# Patient Record
Sex: MALE | Race: WHITE | Employment: OTHER | URBAN - METROPOLITAN AREA
[De-identification: names, ages, dates, MRNs, and addresses within clinical notes are randomized per-mention and may not be internally consistent; named-entity substitution may affect disease eponyms.]

---

## 2017-01-01 ENCOUNTER — OFFICE VISIT (OUTPATIENT)
Dept: FAMILY MEDICINE | Facility: CLINIC | Age: 77
End: 2017-01-01
Payer: COMMERCIAL

## 2017-01-01 ENCOUNTER — MEDICAL CORRESPONDENCE (OUTPATIENT)
Dept: HEALTH INFORMATION MANAGEMENT | Facility: CLINIC | Age: 77
End: 2017-01-01

## 2017-01-01 ENCOUNTER — TELEPHONE (OUTPATIENT)
Dept: FAMILY MEDICINE | Facility: CLINIC | Age: 77
End: 2017-01-01

## 2017-01-01 ENCOUNTER — HOSPITAL ENCOUNTER (EMERGENCY)
Facility: CLINIC | Age: 77
Discharge: HOME | End: 2017-04-24
Attending: FAMILY MEDICINE | Admitting: FAMILY MEDICINE
Payer: COMMERCIAL

## 2017-01-01 ENCOUNTER — ALLIED HEALTH/NURSE VISIT (OUTPATIENT)
Dept: FAMILY MEDICINE | Facility: CLINIC | Age: 77
End: 2017-01-01
Payer: COMMERCIAL

## 2017-01-01 VITALS
SYSTOLIC BLOOD PRESSURE: 131 MMHG | OXYGEN SATURATION: 96 % | DIASTOLIC BLOOD PRESSURE: 75 MMHG | RESPIRATION RATE: 16 BRPM | WEIGHT: 217 LBS | BODY MASS INDEX: 31.59 KG/M2 | TEMPERATURE: 98 F

## 2017-01-01 VITALS
WEIGHT: 217.8 LBS | DIASTOLIC BLOOD PRESSURE: 56 MMHG | SYSTOLIC BLOOD PRESSURE: 131 MMHG | TEMPERATURE: 97.8 F | BODY MASS INDEX: 31.7 KG/M2 | HEART RATE: 58 BPM

## 2017-01-01 VITALS
WEIGHT: 220 LBS | DIASTOLIC BLOOD PRESSURE: 75 MMHG | HEART RATE: 59 BPM | SYSTOLIC BLOOD PRESSURE: 126 MMHG | BODY MASS INDEX: 32.02 KG/M2 | TEMPERATURE: 97.5 F

## 2017-01-01 DIAGNOSIS — E11.9 DIABETES MELLITUS TYPE 2, NONINSULIN DEPENDENT (H): Primary | ICD-10-CM

## 2017-01-01 DIAGNOSIS — R55 FAINTING SPELL: ICD-10-CM

## 2017-01-01 DIAGNOSIS — I10 ESSENTIAL HYPERTENSION, BENIGN: ICD-10-CM

## 2017-01-01 DIAGNOSIS — E78.5 HYPERLIPIDEMIA LDL GOAL <100: ICD-10-CM

## 2017-01-01 DIAGNOSIS — Z23 NEED FOR PROPHYLACTIC VACCINATION AND INOCULATION AGAINST INFLUENZA: Primary | ICD-10-CM

## 2017-01-01 DIAGNOSIS — E11.9 TYPE 2 DIABETES MELLITUS WITHOUT COMPLICATION (H): ICD-10-CM

## 2017-01-01 LAB
ALBUMIN SERPL-MCNC: 3.4 G/DL (ref 3.4–5)
ALP SERPL-CCNC: 66 U/L (ref 40–150)
ALT SERPL W P-5'-P-CCNC: 21 U/L (ref 0–70)
ANION GAP SERPL CALCULATED.3IONS-SCNC: 10 MMOL/L (ref 3–14)
ANION GAP SERPL CALCULATED.3IONS-SCNC: 7 MMOL/L (ref 3–14)
AST SERPL W P-5'-P-CCNC: 18 U/L (ref 0–45)
BASOPHILS # BLD AUTO: 0 10E9/L (ref 0–0.2)
BASOPHILS NFR BLD AUTO: 0.4 %
BILIRUB SERPL-MCNC: 0.5 MG/DL (ref 0.2–1.3)
BUN SERPL-MCNC: 18 MG/DL (ref 7–30)
BUN SERPL-MCNC: 19 MG/DL (ref 7–30)
CALCIUM SERPL-MCNC: 8.8 MG/DL (ref 8.5–10.1)
CALCIUM SERPL-MCNC: 9.2 MG/DL (ref 8.5–10.1)
CHLORIDE SERPL-SCNC: 101 MMOL/L (ref 94–109)
CHLORIDE SERPL-SCNC: 105 MMOL/L (ref 94–109)
CHOLEST SERPL-MCNC: 186 MG/DL
CO2 SERPL-SCNC: 25 MMOL/L (ref 20–32)
CO2 SERPL-SCNC: 27 MMOL/L (ref 20–32)
CREAT SERPL-MCNC: 0.96 MG/DL (ref 0.66–1.25)
CREAT SERPL-MCNC: 1.05 MG/DL (ref 0.66–1.25)
CREAT UR-MCNC: 91 MG/DL
DIFFERENTIAL METHOD BLD: NORMAL
EOSINOPHIL # BLD AUTO: 0.2 10E9/L (ref 0–0.7)
EOSINOPHIL NFR BLD AUTO: 1.6 %
ERYTHROCYTE [DISTWIDTH] IN BLOOD BY AUTOMATED COUNT: 12.7 % (ref 10–15)
GFR SERPL CREATININE-BSD FRML MDRD: 68 ML/MIN/1.7M2
GFR SERPL CREATININE-BSD FRML MDRD: 75 ML/MIN/1.7M2
GLUCOSE BLDC GLUCOMTR-MCNC: 242 MG/DL (ref 70–99)
GLUCOSE SERPL-MCNC: 179 MG/DL (ref 70–99)
GLUCOSE SERPL-MCNC: 204 MG/DL (ref 70–99)
HBA1C MFR BLD: 7.1 % (ref 4.3–6)
HCT VFR BLD AUTO: 45.6 % (ref 40–53)
HDLC SERPL-MCNC: 43 MG/DL
HGB BLD-MCNC: 15.6 G/DL (ref 13.3–17.7)
IMM GRANULOCYTES # BLD: 0 10E9/L (ref 0–0.4)
IMM GRANULOCYTES NFR BLD: 0.3 %
LDLC SERPL CALC-MCNC: 113 MG/DL
LYMPHOCYTES # BLD AUTO: 1.4 10E9/L (ref 0.8–5.3)
LYMPHOCYTES NFR BLD AUTO: 13.8 %
MCH RBC QN AUTO: 30.6 PG (ref 26.5–33)
MCHC RBC AUTO-ENTMCNC: 34.2 G/DL (ref 31.5–36.5)
MCV RBC AUTO: 90 FL (ref 78–100)
MICROALBUMIN UR-MCNC: 18 MG/L
MICROALBUMIN/CREAT UR: 19.54 MG/G CR (ref 0–17)
MONOCYTES # BLD AUTO: 1 10E9/L (ref 0–1.3)
MONOCYTES NFR BLD AUTO: 10 %
NEUTROPHILS # BLD AUTO: 7.3 10E9/L (ref 1.6–8.3)
NEUTROPHILS NFR BLD AUTO: 73.9 %
NONHDLC SERPL-MCNC: 143 MG/DL
PLATELET # BLD AUTO: 220 10E9/L (ref 150–450)
POTASSIUM SERPL-SCNC: 3.9 MMOL/L (ref 3.4–5.3)
POTASSIUM SERPL-SCNC: 4.6 MMOL/L (ref 3.4–5.3)
PROT SERPL-MCNC: 7.2 G/DL (ref 6.8–8.8)
RBC # BLD AUTO: 5.09 10E12/L (ref 4.4–5.9)
SODIUM SERPL-SCNC: 136 MMOL/L (ref 133–144)
SODIUM SERPL-SCNC: 139 MMOL/L (ref 133–144)
T4 FREE SERPL-MCNC: 1.07 NG/DL (ref 0.76–1.46)
TRIGL SERPL-MCNC: 148 MG/DL
TROPONIN I SERPL-MCNC: 0.01 UG/L (ref 0–0.04)
TSH SERPL DL<=0.05 MIU/L-ACNC: 2.53 MU/L (ref 0.4–4)
WBC # BLD AUTO: 9.9 10E9/L (ref 4–11)

## 2017-01-01 PROCEDURE — 85025 COMPLETE CBC W/AUTO DIFF WBC: CPT | Performed by: FAMILY MEDICINE

## 2017-01-01 PROCEDURE — G0008 ADMIN INFLUENZA VIRUS VAC: HCPCS

## 2017-01-01 PROCEDURE — 93005 ELECTROCARDIOGRAM TRACING: CPT

## 2017-01-01 PROCEDURE — 84484 ASSAY OF TROPONIN QUANT: CPT | Performed by: FAMILY MEDICINE

## 2017-01-01 PROCEDURE — 84443 ASSAY THYROID STIM HORMONE: CPT | Performed by: FAMILY MEDICINE

## 2017-01-01 PROCEDURE — 25000128 H RX IP 250 OP 636: Performed by: FAMILY MEDICINE

## 2017-01-01 PROCEDURE — 99207 ZZC NO CHARGE NURSE ONLY: CPT

## 2017-01-01 PROCEDURE — 93308 TTE F-UP OR LMTD: CPT

## 2017-01-01 PROCEDURE — 36415 COLL VENOUS BLD VENIPUNCTURE: CPT | Performed by: FAMILY MEDICINE

## 2017-01-01 PROCEDURE — 00000146 ZZHCL STATISTIC GLUCOSE BY METER IP

## 2017-01-01 PROCEDURE — 99214 OFFICE O/P EST MOD 30 MIN: CPT | Performed by: FAMILY MEDICINE

## 2017-01-01 PROCEDURE — 84439 ASSAY OF FREE THYROXINE: CPT | Performed by: FAMILY MEDICINE

## 2017-01-01 PROCEDURE — 80061 LIPID PANEL: CPT | Performed by: FAMILY MEDICINE

## 2017-01-01 PROCEDURE — 80048 BASIC METABOLIC PNL TOTAL CA: CPT | Performed by: FAMILY MEDICINE

## 2017-01-01 PROCEDURE — 82043 UR ALBUMIN QUANTITATIVE: CPT | Performed by: FAMILY MEDICINE

## 2017-01-01 PROCEDURE — 83036 HEMOGLOBIN GLYCOSYLATED A1C: CPT | Performed by: FAMILY MEDICINE

## 2017-01-01 PROCEDURE — 99284 EMERGENCY DEPT VISIT MOD MDM: CPT | Mod: 25

## 2017-01-01 PROCEDURE — 90662 IIV NO PRSV INCREASED AG IM: CPT

## 2017-01-01 PROCEDURE — 80053 COMPREHEN METABOLIC PANEL: CPT | Performed by: FAMILY MEDICINE

## 2017-01-01 PROCEDURE — 99284 EMERGENCY DEPT VISIT MOD MDM: CPT | Mod: 25 | Performed by: FAMILY MEDICINE

## 2017-01-01 PROCEDURE — 93308 TTE F-UP OR LMTD: CPT | Mod: 26 | Performed by: FAMILY MEDICINE

## 2017-01-01 RX ORDER — SIMVASTATIN 10 MG
10 TABLET ORAL AT BEDTIME
Qty: 90 TABLET | Refills: 3 | Status: CANCELLED | OUTPATIENT
Start: 2017-01-01

## 2017-01-01 RX ORDER — LISINOPRIL AND HYDROCHLOROTHIAZIDE 20; 25 MG/1; MG/1
1 TABLET ORAL EVERY MORNING
Qty: 90 TABLET | Refills: 0 | Status: SHIPPED | OUTPATIENT
Start: 2017-01-01 | End: 2017-01-01

## 2017-01-01 RX ORDER — LISINOPRIL AND HYDROCHLOROTHIAZIDE 20; 25 MG/1; MG/1
1 TABLET ORAL EVERY MORNING
Qty: 90 TABLET | Refills: 0 | Status: SHIPPED | OUTPATIENT
Start: 2017-01-01

## 2017-01-01 RX ORDER — GLIPIZIDE 2.5 MG/1
5 TABLET, EXTENDED RELEASE ORAL
Qty: 180 TABLET | Refills: 0 | Status: SHIPPED | OUTPATIENT
Start: 2017-01-01 | End: 2017-01-01

## 2017-01-01 RX ORDER — PIOGLITAZONEHYDROCHLORIDE 15 MG/1
15 TABLET ORAL DAILY
Qty: 90 TABLET | Refills: 0 | Status: SHIPPED | OUTPATIENT
Start: 2017-01-01 | End: 2017-01-01 | Stop reason: SINTOL

## 2017-01-01 RX ORDER — METOPROLOL SUCCINATE 25 MG/1
12.5 TABLET, EXTENDED RELEASE ORAL DAILY
Qty: 45 TABLET | Refills: 3 | Status: CANCELLED | OUTPATIENT
Start: 2017-01-01

## 2017-01-01 RX ORDER — METOPROLOL SUCCINATE 25 MG/1
12.5 TABLET, EXTENDED RELEASE ORAL DAILY
Qty: 45 TABLET | Refills: 0 | Status: SHIPPED | OUTPATIENT
Start: 2017-01-01 | End: 2017-01-01

## 2017-01-01 RX ORDER — GLIPIZIDE 2.5 MG/1
5 TABLET, EXTENDED RELEASE ORAL
Qty: 180 TABLET | Refills: 2 | Status: SHIPPED | OUTPATIENT
Start: 2017-01-01

## 2017-01-01 RX ADMIN — SODIUM CHLORIDE 1000 ML: 9 INJECTION, SOLUTION INTRAVENOUS at 15:11

## 2017-03-27 NOTE — TELEPHONE ENCOUNTER
Lisinopril/HCTZ     Last Written Prescription Date: 12/07/16  Last Fill Quantity: 90, # refills: 0  Last Office Visit with [facility] or [facility] prescribing provider: 10/14/16       Potassium   Date Value Ref Range Status   05/31/2016 4.0 3.4 - 5.3 mmol/L Final     Creatinine   Date Value Ref Range Status   05/31/2016 1.63 (H) 0.66 - 1.25 mg/dL Final     BP Readings from Last 3 Encounters:   10/14/16 117/70   05/24/16 130/60   08/20/15 128/66     Pioglitazone         Last Written Prescription Date: 12/07/16  Last Fill Quantity: 90, # refills: 0  Last Office Visit with [facility] or [facility] prescribing provider:  10/14/16        BP Readings from Last 3 Encounters:   10/14/16 117/70   05/24/16 130/60   08/20/15 128/66     Lab Results   Component Value Date    MICROL 14 05/24/2016     No results found for: MICROALBUMIN  Creatinine   Date Value Ref Range Status   05/31/2016 1.63 (H) 0.66 - 1.25 mg/dL Final   ]  GFR Estimate   Date Value Ref Range Status   05/31/2016 41 (L) >60 mL/min/1.7m2 Final     Comment:     Non  GFR Calc   04/21/2015 76 >60 mL/min/1.7m2 Final     Comment:     Non  GFR Calc   06/10/2014 77 >60 mL/min/1.7m2 Final     GFR Estimate If Black   Date Value Ref Range Status   05/31/2016 50 (L) >60 mL/min/1.7m2 Final     Comment:      GFR Calc   04/21/2015 >90   GFR Calc   >60 mL/min/1.7m2 Final   06/10/2014 >90 >60 mL/min/1.7m2 Final     Lab Results   Component Value Date    CHOL 140 05/31/2016     Lab Results   Component Value Date    HDL 40 05/31/2016     Lab Results   Component Value Date    LDL 68 05/31/2016     Lab Results   Component Value Date    TRIG 161 05/31/2016     Lab Results   Component Value Date    CHOLHDLRATIO 3.4 04/21/2015     Lab Results   Component Value Date    AST 25 10/29/2012     Lab Results   Component Value Date    ALT 20 08/17/2015     Lab Results   Component Value Date    A1C 7.2 05/31/2016    A1C 6.5 08/17/2015     A1C 7.3 04/21/2015    A1C 7.2 03/05/2015    A1C 8.0 06/10/2014     Potassium   Date Value Ref Range Status   05/31/2016 4.0 3.4 - 5.3 mmol/L Final

## 2017-03-29 NOTE — TELEPHONE ENCOUNTER
Routing refill request to provider for review/approval because:  Labs out of range:  See below    Ksenia Rico RN

## 2017-04-24 NOTE — ED AVS SNAPSHOT
[facility] Emergency Department    [address] [address]    Phone:  [phone]    Fax:  [phone]                                       Richard Sosa   MRN: [id number]    Department:  [facility] Emergency Department   Date of Visit:  4/24/2017           Patient Information     Date Of Birth          1940        Your diagnoses for this visit were:     Fainting spell        You were seen by Blake Durán MD.        Discharge Instructions       Return to the Emergency Room if the following occurs:     Recurrent fainting, new chest pain or trouble breathing, or for any concern at anytime.      Or, follow-up with the following provider as we discussed:     Return to your primary doctor as needed.    Medications discussed:    None new.  No changes.    If you received pain-relieving or sedating medication during your time in the ER, avoid alcohol, driving automobiles, or working with machinery.  Also, a responsible adult must stay with you.      If you had X-rays or labs done we will attempt to contact you if there is a change needed in your care.      Call the Nurse Advice Line at [phone] or [phone] for any concern at anytime.      24 Hour Appointment Hotline       To make an appointment at any [facility] clinic, call [phone] ([phone]). If you don't have a family doctor or clinic, we will help you find one. [facility] clinics are conveniently located to serve the needs of you and your family.             Review of your medicines      Our records show that you are taking the medicines listed below. If these are incorrect, please call your family doctor or clinic.        Dose / Directions Last dose taken    aspirin 81 MG EC tablet   Dose:  81 mg        Take 81 mg by mouth daily   Refills:  0        glipiZIDE 2.5 MG 24 hr tablet   Commonly known as:  GLUCOTROL XL   Dose:  5 mg   Quantity:  180 tablet        Take 2 tablets (5 mg) by mouth daily (with  breakfast)   Refills:  3        IBUPROFEN PO   Dose:  200 mg        Take 200 mg by mouth 2 times daily   Refills:  0        lisinopril-hydrochlorothiazide 20-25 MG per tablet   Commonly known as:  PRINZIDE/ZESTORETIC   Dose:  1 tablet   Quantity:  90 tablet        Take 1 tablet by mouth every morning   Refills:  0        metFORMIN 500 MG tablet   Commonly known as:  GLUCOPHAGE   Dose:  500 mg   Quantity:  180 tablet        Take 1 tablet (500 mg) by mouth 2 times daily (with meals)   Refills:  3        metoprolol 25 MG 24 hr tablet   Commonly known as:  TOPROL-XL   Dose:  12.5 mg   Quantity:  45 tablet        Take 0.5 tablets (12.5 mg) by mouth daily   Refills:  3        simvastatin 10 MG tablet   Commonly known as:  ZOCOR   Dose:  10 mg   Quantity:  90 tablet        Take 1 tablet (10 mg) by mouth At Bedtime (Needs follow-up appointment for this medication)   Refills:  3                Procedures and tests performed during your visit     CBC with platelets differential    Comprehensive metabolic panel    EKG 12 lead    Glucose Monitoring Nursing    Glucose by meter    POC US ECHO LIMITED    Troponin I      Orders Needing Specimen Collection     None      Pending Results     No orders found from 4/22/2017 to 4/25/2017.            Pending Culture Results     No orders found from 4/22/2017 to 4/25/2017.            Test Results From Your Hospital Stay        4/24/2017  2:40 PM      Component Results     Component Value Ref Range & Units Status    Glucose 242 (H) 70 - 99 mg/dL Final         4/24/2017  3:07 PM      Component Results     Component Value Ref Range & Units Status    WBC 9.9 4.0 - 11.0 10e9/L Final    RBC Count 5.09 4.4 - 5.9 10e12/L Final    Hemoglobin 15.6 13.3 - 17.7 g/dL Final    Hematocrit 45.6 40.0 - 53.0 % Final    MCV 90 78 - 100 fl Final    MCH 30.6 26.5 - 33.0 pg Final    MCHC 34.2 31.5 - 36.5 g/dL Final    RDW 12.7 10.0 - 15.0 % Final    Platelet Count 220 150 - 450 10e9/L Final    Diff Method  Automated Method  Final    % Neutrophils 73.9 % Final    % Lymphocytes 13.8 % Final    % Monocytes 10.0 % Final    % Eosinophils 1.6 % Final    % Basophils 0.4 % Final    % Immature Granulocytes 0.3 % Final    Absolute Neutrophil 7.3 1.6 - 8.3 10e9/L Final    Absolute Lymphocytes 1.4 0.8 - 5.3 10e9/L Final    Absolute Monocytes 1.0 0.0 - 1.3 10e9/L Final    Absolute Eosinophils 0.2 0.0 - 0.7 10e9/L Final    Absolute Basophils 0.0 0.0 - 0.2 10e9/L Final    Abs Immature Granulocytes 0.0 0 - 0.4 10e9/L Final         4/24/2017  3:12 PM      Component Results     Component Value Ref Range & Units Status    Sodium 136 133 - 144 mmol/L Final    Potassium 3.9 3.4 - 5.3 mmol/L Final    Chloride 101 94 - 109 mmol/L Final    Carbon Dioxide 25 20 - 32 mmol/L Final    Anion Gap 10 3 - 14 mmol/L Final    Glucose 204 (H) 70 - 99 mg/dL Final    Urea Nitrogen 19 7 - 30 mg/dL Final    Creatinine 1.05 0.66 - 1.25 mg/dL Final    GFR Estimate 68 >60 mL/min/1.7m2 Final    Non  GFR Calc    GFR Estimate If Black 83 >60 mL/min/1.7m2 Final    African American GFR Calc    Calcium 8.8 8.5 - 10.1 mg/dL Final    Bilirubin Total 0.5 0.2 - 1.3 mg/dL Final    Albumin 3.4 3.4 - 5.0 g/dL Final    Protein Total 7.2 6.8 - 8.8 g/dL Final    Alkaline Phosphatase 66 40 - 150 U/L Final    ALT 21 0 - 70 U/L Final    AST 18 0 - 45 U/L Final         4/24/2017  3:25 PM      Component Results     Component Value Ref Range & Units Status    Troponin I ES 0.015 0.000 - 0.045 ug/L Final    The 99th percentile for upper reference range is 0.045 ug/L.  Troponin values   in   the range of 0.045 - 0.120 ug/L may be associated with risks of adverse   clinical events.           4/24/2017  3:08 PM      [facility] Procedure Note      Limited Bedside ED Cardiac Ultrasound:    PROCEDURE: PERFORMED BY: Dr. Blake M. Luis Armando  INDICATIONS/SYMPTOM:  syncope  PROBE: Cardiac phased array probe  BODY LOCATION: Chest  FINDINGS:   The ultrasound  "was performed utilizing the subcostal, parasternal long axis and parasternal short axis views.  Cardiac contractility:  Present  Gross estimation of cardiac kinesis: normal  Pericardial Effusion:  None  RV:LV ratio: Equal  IVC:    Diameter:  IVC diameter expiration (IVCe) 3 cm                                                   IVC diameter inspiration (IVCi) 3 cm                                                       Collapsibility:  IVC collapses < 50% with inspiration  INTERPRETATION:    Chamber size and motion were grossly normal with LV > RV, normal cardiac kinesis.  No pericardial effusion was found.  IVC visualized and findings indicate normovolemia.  IMAGE DOCUMENTATION: Images were archived to hard drive.                      Thank you for choosing [facility]       Thank you for choosing [facility] for your care. Our goal is always to provide you with excellent care. Hearing back from our patients is one way we can continue to improve our services. Please take a few minutes to complete the written survey that you may receive in the mail after you visit with us. Thank you!        "[employer] Information     [employer] lets you send messages to your doctor, view your test results, renew your prescriptions, schedule appointments and more. To sign up, go to [email or web address] . Click on \"Log in\" on the left side of the screen, which will take you to the Welcome page. Then click on \"Sign up Now\" on the right side of the page.     You will be asked to enter the access code listed below, as well as some personal information. Please follow the directions to create your username and password.     Your access code is: [email or web address]  Expires: 2017  4:26 PM     Your access code will  in 90 days. If you need help or a new code, please call your [facility] clinic or [phone].        Care EveryWhere ID     This is your Care EveryWhere ID. This could be used by other organizations to access your [facility] medical " records  [id number]        After Visit Summary       This is your record. Keep this with you and show to your community pharmacist(s) and doctor(s) at your next visit.
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
## 2017-04-24 NOTE — ED AVS SNAPSHOT
[facility] Emergency Department    [address] [address]    Phone:  [phone]    Fax:  [phone]                                       Richard Sosa   MRN: [id number]    Department:  [facility] Emergency Department   Date of Visit:  4/24/2017           After Visit Summary Signature Page     I have received my discharge instructions, and my questions have been answered. I have discussed any challenges I see with this plan with the nurse or doctor.    ..........................................................................................................................................  Patient/Patient Representative Signature      ..........................................................................................................................................  Patient Representative Print Name and Relationship to Patient    ..................................................               ................................................  Date                                            Time    ..........................................................................................................................................  Reviewed by Signature/Title    ...................................................              ..............................................  Date                                                            Time

## 2017-05-31 NOTE — TELEPHONE ENCOUNTER
Glipizide         Last Written Prescription Date: 05/04/2016  Last Fill Quantity: 180, # refills: 3  Last Office Visit with G, [facility] or [facility] prescribing provider:  10/14/2016        BP Readings from Last 3 Encounters:   04/24/17 131/75   10/14/16 117/70   05/24/16 130/60     Lab Results   Component Value Date    MICROL 14 05/24/2016     Lab Results   Component Value Date    UMALCR 8.60 05/24/2016     Creatinine   Date Value Ref Range Status   04/24/2017 1.05 0.66 - 1.25 mg/dL Final   ]  GFR Estimate   Date Value Ref Range Status   04/24/2017 68 >60 mL/min/1.7m2 Final     Comment:     Non  GFR Calc   05/31/2016 41 (L) >60 mL/min/1.7m2 Final     Comment:     Non  GFR Calc   04/21/2015 76 >60 mL/min/1.7m2 Final     Comment:     Non  GFR Calc     GFR Estimate If Black   Date Value Ref Range Status   04/24/2017 83 >60 mL/min/1.7m2 Final     Comment:      GFR Calc   05/31/2016 50 (L) >60 mL/min/1.7m2 Final     Comment:      GFR Calc   04/21/2015 >90   GFR Calc   >60 mL/min/1.7m2 Final     Lab Results   Component Value Date    CHOL 140 05/31/2016     Lab Results   Component Value Date    HDL 40 05/31/2016     Lab Results   Component Value Date    LDL 68 05/31/2016     Lab Results   Component Value Date    TRIG 161 05/31/2016     Lab Results   Component Value Date    CHOLHDLRATIO 3.4 04/21/2015     Lab Results   Component Value Date    AST 18 04/24/2017     Lab Results   Component Value Date    ALT 21 04/24/2017     Lab Results   Component Value Date    A1C 7.2 05/31/2016    A1C 6.5 08/17/2015    A1C 7.3 04/21/2015    A1C 7.2 03/05/2015    A1C 8.0 06/10/2014     Potassium   Date Value Ref Range Status   04/24/2017 3.9 3.4 - 5.3 mmol/L Final

## 2017-05-31 NOTE — LETTER
[facility]  [address] [address]  Phone: [phone]    June 7, 2017    Richard Sosa                                                                                                               [address] [place] [address]            Dear Mr. Sosa,    We are concerned about your health care.  We recently provided you with a medication refill.  Many medications require routine follow-up with your Doctor.      At this time we ask that: You schedule a routine office visit with your physician to follow your Diabetes. You need to be seen every 6 months for a Diabetes recheck appointment.    Your prescription: Has been refilled for 3 months so you may have time for the above noted follow-up. Please be seen prior to needing your next refill of medication.       Thank you,      Medina Grissom MD / [facility]

## 2017-06-27 NOTE — TELEPHONE ENCOUNTER
lisinopril-hydrochlorothiazide    Last Written Prescription Date: 03/29/2017  Last Fill Quantity: 90, # refills: 0  Last Office Visit with G, P or [facility] prescribing provider: 10/14/2016       Potassium   Date Value Ref Range Status   04/24/2017 3.9 3.4 - 5.3 mmol/L Final     Creatinine   Date Value Ref Range Status   04/24/2017 1.05 0.66 - 1.25 mg/dL Final     BP Readings from Last 3 Encounters:   04/24/17 131/75   10/14/16 117/70   05/24/16 130/60

## 2017-07-06 NOTE — TELEPHONE ENCOUNTER
Prescription approved per [facility] Refill Protocol.    Dinora BUENROSTRO RN

## 2017-07-20 NOTE — PROGRESS NOTES
SUBJECTIVE:                                                    Richard Sosa is 76 year old male   Chief Complaint   Patient presents with     Diabetes     fasting     Diabetes Follow-up      Patient is checking blood sugars: Once a week 140-150    Diabetic concerns: None     Symptoms of hypoglycemia (low blood sugar): none     Paresthesias (numbness or burning in feet) or sores: No     Date of last diabetic eye exam: 2 months ago     Hyperlipidemia Follow-Up      Rate your low fat/cholesterol diet?: fair    Taking statin?  Yes, Occasional muscle aches from statin    Other lipid medications/supplements?:  none    Hypertension Follow-up      Outpatient blood pressures are being checked at home.  Results are 132/78-80 .    Low Salt Diet: low salt        Problem list and histories reviewed & adjusted, as indicated.  Additional history: as documented  Patient Active Problem List   Diagnosis     Hyperlipidemia LDL goal <100     Health Care Home     Erectile dysfunction     Hypertension, goal below 140/90     Fracture of wrist     Neurodermatitis, localized     Enlarged lymph nodes     Advanced directives, counseling/discussion     Tinea pedis of both feet     Diabetes mellitus type 2, noninsulin dependent (H)     Past Surgical History:   Procedure Laterality Date     ROTATOR CUFF REPAIR RT/LT      right     SURGICAL HISTORY OF -   1994    radical retropubic prostatectomy     SURGICAL HISTORY OF -   age 7 wks    appendectomy     SURGICAL HISTORY OF -   1985    ORIF (left ankle fx)      SURGICAL HISTORY OF -   08/04/05    colonoscopy       Social History   Substance Use Topics     Smoking status: Never Smoker     Smokeless tobacco: Never Used     Alcohol use Yes      Comment: occ. rare     Family History   Problem Relation Age of Onset     DIABETES Mother      GASTROINTESTINAL DISEASE Mother      liver     CANCER Brother      lung     Breast Cancer Sister          Current Outpatient Prescriptions   Medication Sig  Dispense Refill     order for DME Diabetic shoes 1 Device 1     lisinopril-hydrochlorothiazide (PRINZIDE/ZESTORETIC) 20-25 MG per tablet Take 1 tablet by mouth every morning 90 tablet 0     glipiZIDE (GLUCOTROL XL) 2.5 MG 24 hr tablet Take 2 tablets (5 mg) by mouth daily (with breakfast) 180 tablet 0     IBUPROFEN PO Take 200 mg by mouth 2 times daily       aspirin 81 MG EC tablet Take 81 mg by mouth daily       metoprolol (TOPROL-XL) 25 MG 24 hr tablet Take 0.5 tablets (12.5 mg) by mouth daily 45 tablet 3     simvastatin (ZOCOR) 10 MG tablet Take 1 tablet (10 mg) by mouth At Bedtime (Needs follow-up appointment for this medication) 90 tablet 3     metFORMIN (GLUCOPHAGE) 500 MG tablet Take 1 tablet (500 mg) by mouth 2 times daily (with meals) 180 tablet 3     No Known Allergies  Recent Labs   Lab Test  07/20/17   0825  04/24/17   1444  05/31/16   0656  08/17/15   0727  04/21/15   0657   06/27/13   0716   A1C  7.1*   --   7.2*  6.5*  7.3*   < >  8.7*   LDL  113*   --   68   --   62   < >  91   HDL  43   --   40   --   39*   < >  39*   TRIG  148   --   161*   --   148   < >  162*   ALT   --   21   --   20   --    --   21   CR  0.96  1.05  1.63*   --   0.96   < >  0.88   GFRESTIMATED  75  68  41*   --   76   < >  85   GFRESTBLACK  >90   GFR Calc    83  50*   --   >90   GFR Calc     < >  >90   POTASSIUM  4.6  3.9  4.0   --   4.2   < >  4.2   TSH  2.53   --   1.46   --   2.92   < >   --     < > = values in this interval not displayed.      BP Readings from Last 3 Encounters:   07/20/17 131/56   04/24/17 131/75   10/14/16 117/70    Wt Readings from Last 3 Encounters:   07/20/17 217 lb 12.8 oz (98.8 kg)   04/24/17 217 lb (98.4 kg)   10/14/16 222 lb (100.7 kg)         ROS:  Constitutional, HEENT, cardiovascular, pulmonary, gi and gu systems are negative, except as otherwise noted.    OBJECTIVE:                                                    /56  Pulse 58  Temp 97.8  F (36.6  C)  (Tympanic)  Wt 217 lb 12.8 oz (98.8 kg)  BMI 31.7 kg/m2  GENERAL APPEARANCE ADULT: Alert, no acute distress  RESP: lungs clear to auscultation   CV: normal rate, regular rhythm, no murmur or gallop  MS: extremities normal, no peripheral edema  Diabetic foot exam: normal DP and PT pulses, no trophic changes or ulcerative lesions and reduced sensation at all points on both feet.  Thickened heel skin, debri and moisture between lateral toes.  SKIN: no suspicious lesions or rashes  NEURO: Alert, oriented, speech and mentation normal, poor memory but aware and gets help  PSYCH: mentation appears normal., affect and mood normal  Diagnostic Test Results:  Results for orders placed or performed in visit on 07/20/17   Hemoglobin A1c   Result Value Ref Range    Hemoglobin A1C 7.1 (H) 4.3 - 6.0 %   Basic metabolic panel   Result Value Ref Range    Sodium 139 133 - 144 mmol/L    Potassium 4.6 3.4 - 5.3 mmol/L    Chloride 105 94 - 109 mmol/L    Carbon Dioxide 27 20 - 32 mmol/L    Anion Gap 7 3 - 14 mmol/L    Glucose 179 (H) 70 - 99 mg/dL    Urea Nitrogen 18 7 - 30 mg/dL    Creatinine 0.96 0.66 - 1.25 mg/dL    GFR Estimate 75 >60 mL/min/1.7m2    GFR Estimate If Black >90   GFR Calc   >60 mL/min/1.7m2    Calcium 9.2 8.5 - 10.1 mg/dL   Lipid panel reflex to direct LDL   Result Value Ref Range    Cholesterol 186 <200 mg/dL    Triglycerides 148 <150 mg/dL    HDL Cholesterol 43 >39 mg/dL    LDL Cholesterol Calculated 113 (H) <100 mg/dL    Non HDL Cholesterol 143 (H) <130 mg/dL   TSH   Result Value Ref Range    TSH 2.53 0.40 - 4.00 mU/L   T4 FREE   Result Value Ref Range    T4 Free 1.07 0.76 - 1.46 ng/dL   Albumin Random Urine Quantitative   Result Value Ref Range    Creatinine Urine 91 mg/dL    Albumin Urine mg/L 18 mg/L    Albumin Urine mg/g Cr 19.54 (H) 0 - 17 mg/g Cr          ASSESSMENT/PLAN:                                                    1. Essential hypertension, benign  At goal, does not know what meds he  takes and did not bring them with, pharmacy contacted and does not need refills, med list updated with pharmacy.    2. Hyperlipidemia LDL goal <100    3. Diabetes mellitus type 2, noninsulin dependent (H)  At goal, feet need some hygiene, set up with [facility] provider to help.  - Hemoglobin A1c  - Basic metabolic panel  - Lipid panel reflex to direct LDL  - TSH  - T4 FREE  - Albumin Random Urine Quantitative  - order for DME; Diabetic shoes  Dispense: 1 Device; Refill: 1    Medina Grissom MD  [facility]

## 2017-07-20 NOTE — NURSING NOTE
"Initial /56  Pulse 58  Temp 97.8  F (36.6  C) (Tympanic)  Wt 217 lb 12.8 oz (98.8 kg)  BMI 31.7 kg/m2 Estimated body mass index is 31.7 kg/(m^2) as calculated from the following:    Height as of 10/14/16: 5' 9.5\" (1.765 m).    Weight as of this encounter: 217 lb 12.8 oz (98.8 kg). .      "

## 2017-07-20 NOTE — LETTER
Richard Sosa  [address] [place] [id number]        July 24, 2017          Dear ,    We are writing to inform you of your test results.    Glucose, a1c and MAC urine a bit off but ok, not change in treatment.      Resulted Orders   Hemoglobin A1c   Result Value Ref Range    Hemoglobin A1C 7.1 (H) 4.3 - 6.0 %   Basic metabolic panel   Result Value Ref Range    Sodium 139 133 - 144 mmol/L    Potassium 4.6 3.4 - 5.3 mmol/L      Comment:      Specimen slightly hemolyzed, potassium may be falsely elevated    Chloride 105 94 - 109 mmol/L    Carbon Dioxide 27 20 - 32 mmol/L    Anion Gap 7 3 - 14 mmol/L    Glucose 179 (H) 70 - 99 mg/dL    Urea Nitrogen 18 7 - 30 mg/dL    Creatinine 0.96 0.66 - 1.25 mg/dL    GFR Estimate 75 >60 mL/min/1.7m2      Comment:      Non  GFR Calc    GFR Estimate If Black >90   GFR Calc   >60 mL/min/1.7m2    Calcium 9.2 8.5 - 10.1 mg/dL   Lipid panel reflex to direct LDL   Result Value Ref Range    Cholesterol 186 <200 mg/dL    Triglycerides 148 <150 mg/dL    HDL Cholesterol 43 >39 mg/dL    LDL Cholesterol Calculated 113 (H) <100 mg/dL      Comment:      Above desirable:  100-129 mg/dl   Borderline High:  130-159 mg/dL   High:             160-189 mg/dL   Very high:       >189 mg/dl      Non HDL Cholesterol 143 (H) <130 mg/dL      Comment:      Above Desirable:  130-159 mg/dl   Borderline high:  160-189 mg/dl   High:             190-219 mg/dl   Very high:       >219 mg/dl     TSH   Result Value Ref Range    TSH 2.53 0.40 - 4.00 mU/L   T4 FREE   Result Value Ref Range    T4 Free 1.07 0.76 - 1.46 ng/dL   Albumin Random Urine Quantitative   Result Value Ref Range    Creatinine Urine 91 mg/dL    Albumin Urine mg/L 18 mg/L    Albumin Urine mg/g Cr 19.54 (H) 0 - 17 mg/g Cr       If you have any questions or concerns, please call the clinic at the number listed above.       Sincerely,        Medina Grissom MD

## 2017-07-20 NOTE — TELEPHONE ENCOUNTER
Metformin         Last Written Prescription Date: 05/24/16  Last Fill Quantity: 180, # refills: 3  Last Office Visit with [facility], [facility] or [facility] prescribing provider:  01/20/17        BP Readings from Last 3 Encounters:   07/20/17 131/56   04/24/17 131/75   10/14/16 117/70     Lab Results   Component Value Date    MICROL 18 07/20/2017     Lab Results   Component Value Date    UMALCR 19.54 07/20/2017     Creatinine   Date Value Ref Range Status   07/20/2017 0.96 0.66 - 1.25 mg/dL Final   ]  GFR Estimate   Date Value Ref Range Status   07/20/2017 75 >60 mL/min/1.7m2 Final     Comment:     Non  GFR Calc   04/24/2017 68 >60 mL/min/1.7m2 Final     Comment:     Non  GFR Calc   05/31/2016 41 (L) >60 mL/min/1.7m2 Final     Comment:     Non  GFR Calc     GFR Estimate If Black   Date Value Ref Range Status   07/20/2017 >90   GFR Calc   >60 mL/min/1.7m2 Final   04/24/2017 83 >60 mL/min/1.7m2 Final     Comment:      GFR Calc   05/31/2016 50 (L) >60 mL/min/1.7m2 Final     Comment:      GFR Calc     Lab Results   Component Value Date    CHOL 186 07/20/2017     Lab Results   Component Value Date    HDL 43 07/20/2017     Lab Results   Component Value Date     07/20/2017     Lab Results   Component Value Date    TRIG 148 07/20/2017     Lab Results   Component Value Date    CHOLHDLRATIO 3.4 04/21/2015     Lab Results   Component Value Date    AST 18 04/24/2017     Lab Results   Component Value Date    ALT 21 04/24/2017     Lab Results   Component Value Date    A1C 7.1 07/20/2017    A1C 7.2 05/31/2016    A1C 6.5 08/17/2015    A1C 7.3 04/21/2015    A1C 7.2 03/05/2015     Potassium   Date Value Ref Range Status   07/20/2017 4.6 3.4 - 5.3 mmol/L Final     Comment:     Specimen slightly hemolyzed, potassium may be falsely elevated

## 2017-07-20 NOTE — MR AVS SNAPSHOT
"              After Visit Summary   2017    Richard Sosa    MRN: [id number]           Patient Information     Date Of Birth          1940        Visit Information        Provider Department      2017 7:40 AM Medina Grissom MD [facility]        Today's Diagnoses     Diabetes mellitus type 2, noninsulin dependent (H)    -  1    Essential hypertension, benign        Hyperlipidemia LDL goal <100           Follow-ups after your visit        Who to contact     If you have questions or need follow up information about today's clinic visit or your schedule please contact [facility] directly at [phone].  Normal or non-critical lab and imaging results will be communicated to you by MyChart, letter or phone within 4 business days after the clinic has received the results. If you do not hear from us within 7 days, please contact the clinic through [facility] or phone. If you have a critical or abnormal lab result, we will notify you by phone as soon as possible.  Submit refill requests through [employer] or call your pharmacy and they will forward the refill request to us. Please allow 3 business days for your refill to be completed.          Additional Information About Your Visit        MyChart Information     Gynzy lets you send messages to your doctor, view your test results, renew your prescriptions, schedule appointments and more. To sign up, go to [email or web address] . Click on \"Log in\" on the left side of the screen, which will take you to the Welcome page. Then click on \"Sign up Now\" on the right side of the page.     You will be asked to enter the access code listed below, as well as some personal information. Please follow the directions to create your username and password.     Your access code is: [email or web address]  Expires: 2017  4:26 PM     Your access code will  in 90 days. If you need help or a new code, please call your [place] clinic or " [phone].        Care EveryWhere ID     This is your Care EveryWhere ID. This could be used by other organizations to access your [facility] medical records  [id number]        Your Vitals Were     Pulse Temperature BMI (Body Mass Index)             58 97.8  F (36.6  C) (Tympanic) 31.7 kg/m2          Blood Pressure from Last 3 Encounters:   07/20/17 131/56   04/24/17 131/75   10/14/16 117/70    Weight from Last 3 Encounters:   07/20/17 217 lb 12.8 oz (98.8 kg)   04/24/17 217 lb (98.4 kg)   10/14/16 222 lb (100.7 kg)              We Performed the Following     Albumin Random Urine Quantitative     Basic metabolic panel     Hemoglobin A1c     Lipid panel reflex to direct LDL     T4 FREE     TSH          Today's Medication Changes          These changes are accurate as of: 7/20/17  8:14 AM.  If you have any questions, ask your nurse or doctor.               Start taking these medicines.        Dose/Directions    order for DME   Used for:  Diabetes mellitus type 2, noninsulin dependent (H)        Diabetic shoes   Quantity:  1 Device   Refills:  1            Where to get your medicines      Some of these will need a paper prescription and others can be bought over the counter.  Ask your nurse if you have questions.     Bring a paper prescription for each of these medications     order for DME                Primary Care Provider Office Phone # Fax #    Medina Pavithra Grissom [phone]       [facility] [address] [address]        Equal Access to Services     ESTHER SATLLWORTH AH: Hadmaggie mike Sogabino, waaxda luqadaha, qaybta kaalmada adegema, waxrosette marcus chamberlain adechalino altman. So [facility] [phone].    ATENCIÓN: Si habla español, tiene a joaquin disposición servicios gratuitos de asistencia lingüística. Llame al [phone].    We comply with applicable federal civil rights laws and [place] laws. We do not discriminate on the basis of race, color, national origin,  age, disability sex, sexual orientation or gender identity.            Thank you!     Thank you for choosing [facility]  for your care. Our goal is always to provide you with excellent care. Hearing back from our patients is one way we can continue to improve our services. Please take a few minutes to complete the written survey that you may receive in the mail after your visit with us. Thank you!             Your Updated Medication List - Protect others around you: Learn how to safely use, store and throw away your medicines at www.disposemymeds.org.          This list is accurate as of: 7/20/17  8:14 AM.  Always use your most recent med list.                   Brand Name Dispense Instructions for use Diagnosis    aspirin 81 MG EC tablet      Take 81 mg by mouth daily        glipiZIDE 2.5 MG 24 hr tablet    GLUCOTROL XL    180 tablet    Take 2 tablets (5 mg) by mouth daily (with breakfast)    Type 2 diabetes mellitus without complication (H)       IBUPROFEN PO      Take 200 mg by mouth 2 times daily        lisinopril-hydrochlorothiazide 20-25 MG per tablet    PRINZIDE/ZESTORETIC    90 tablet    Take 1 tablet by mouth every morning    Essential hypertension, benign       metFORMIN 500 MG tablet    GLUCOPHAGE    180 tablet    Take 1 tablet (500 mg) by mouth 2 times daily (with meals)    Type 2 diabetes mellitus without complication (H)       metoprolol 25 MG 24 hr tablet    TOPROL-XL    45 tablet    Take 0.5 tablets (12.5 mg) by mouth daily    Essential hypertension, benign       order for DME     1 Device    Diabetic shoes    Diabetes mellitus type 2, noninsulin dependent (H)       simvastatin 10 MG tablet    ZOCOR    90 tablet    Take 1 tablet (10 mg) by mouth At Bedtime (Needs follow-up appointment for this medication)    Hyperlipidemia LDL goal <100

## 2017-07-24 NOTE — PROGRESS NOTES
Glucose, a1c and MAC urine a bit off but ok, not change in treatment.  Please notify.        Thank you. RONALDO HIGGINS MD

## 2017-09-07 NOTE — TELEPHONE ENCOUNTER
Glipizide      Last Written Prescription Date: 06/07/17  Last Fill Quantity: 180, # refills: 0  Last Office Visit with [facility], [facility] or [facility] prescribing provider:  07/20/17        BP Readings from Last 3 Encounters:   07/20/17 131/56   04/24/17 131/75   10/14/16 117/70     Lab Results   Component Value Date    MICROL 18 07/20/2017     Lab Results   Component Value Date    UMALCR 19.54 07/20/2017     Creatinine   Date Value Ref Range Status   07/20/2017 0.96 0.66 - 1.25 mg/dL Final   ]  GFR Estimate   Date Value Ref Range Status   07/20/2017 75 >60 mL/min/1.7m2 Final     Comment:     Non  GFR Calc   04/24/2017 68 >60 mL/min/1.7m2 Final     Comment:     Non  GFR Calc   05/31/2016 41 (L) >60 mL/min/1.7m2 Final     Comment:     Non  GFR Calc     GFR Estimate If Black   Date Value Ref Range Status   07/20/2017 >90   GFR Calc   >60 mL/min/1.7m2 Final   04/24/2017 83 >60 mL/min/1.7m2 Final     Comment:      GFR Calc   05/31/2016 50 (L) >60 mL/min/1.7m2 Final     Comment:      GFR Calc     Lab Results   Component Value Date    CHOL 186 07/20/2017     Lab Results   Component Value Date    HDL 43 07/20/2017     Lab Results   Component Value Date     07/20/2017     Lab Results   Component Value Date    TRIG 148 07/20/2017     Lab Results   Component Value Date    CHOLHDLRATIO 3.4 04/21/2015     Lab Results   Component Value Date    AST 18 04/24/2017     Lab Results   Component Value Date    ALT 21 04/24/2017     Lab Results   Component Value Date    A1C 7.1 07/20/2017    A1C 7.2 05/31/2016    A1C 6.5 08/17/2015    A1C 7.3 04/21/2015    A1C 7.2 03/05/2015     Potassium   Date Value Ref Range Status   07/20/2017 4.6 3.4 - 5.3 mmol/L Final     Comment:     Specimen slightly hemolyzed, potassium may be falsely elevated

## 2017-10-03 NOTE — TELEPHONE ENCOUNTER
Lisinopril      Last Written Prescription Date: 07/06/2017  Last Fill Quantity: 90, # refills: 0  Last Office Visit with [facility] or [facility] prescribing provider: 07/20/2017       Potassium   Date Value Ref Range Status   07/20/2017 4.6 3.4 - 5.3 mmol/L Final     Comment:     Specimen slightly hemolyzed, potassium may be falsely elevated     Creatinine   Date Value Ref Range Status   07/20/2017 0.96 0.66 - 1.25 mg/dL Final     BP Readings from Last 3 Encounters:   07/20/17 131/56   04/24/17 131/75   10/14/16 117/70           Metoprolol      Last Written Prescription Date: 10/14/2016  Last Fill Quantity: 45, # refills: 3    Last Office Visit with [facility], [facility] or [facility] prescribing provider:  07/20/2017   Future Office Visit:        BP Readings from Last 3 Encounters:   07/20/17 131/56   04/24/17 131/75   10/14/16 117/70

## 2017-10-18 NOTE — PROGRESS NOTES
Injectable Influenza Immunization Documentation    1.  Is the person to be vaccinated sick today?   No    2. Does the person to be vaccinated have an allergy to a component   of the vaccine?   No    3. Has the person to be vaccinated ever had a serious reaction   to influenza vaccine in the past?   No    4. Has the person to be vaccinated ever had Guillain-Barré syndrome?   No    Form completed by Shonna Gutierrez, [facility]

## 2017-10-18 NOTE — MR AVS SNAPSHOT
"              After Visit Summary   10/18/2017    Richard Sosa    MRN: [id number]           Patient Information     Date Of Birth          1940        Visit Information        Provider Department      10/18/2017 12:00 PM [facility]        Today's Diagnoses     Need for prophylactic vaccination and inoculation against influenza    -  1       Follow-ups after your visit        Who to contact     If you have questions or need follow up information about today's clinic visit or your schedule please contact [facility] directly at [phone].  Normal or non-critical lab and imaging results will be communicated to you by [employer], letter or phone within 4 business days after the clinic has received the results. If you do not hear from us within 7 days, please contact the clinic through [employer] or phone. If you have a critical or abnormal lab result, we will notify you by phone as soon as possible.  Submit refill requests through [employer] or call your pharmacy and they will forward the refill request to us. Please allow 3 business days for your refill to be completed.          Additional Information About Your Visit        MyChart Information     [employer] lets you send messages to your doctor, view your test results, renew your prescriptions, schedule appointments and more. To sign up, go to [email or web address] . Click on \"Log in\" on the left side of the screen, which will take you to the Welcome page. Then click on \"Sign up Now\" on the right side of the page.     You will be asked to enter the access code listed below, as well as some personal information. Please follow the directions to create your username and password.     Your access code is: [email or web address]  Expires: 2018 12:29 PM     Your access code will  in 90 days. If you need help or a new code, please call your [facility] or [phone].        Care EveryWhere ID     This is your Care " EveryWhere ID. This could be used by other organizations to access your [place] medical records  [id number]         Blood Pressure from Last 3 Encounters:   07/20/17 131/56   04/24/17 131/75   10/14/16 117/70    Weight from Last 3 Encounters:   07/20/17 217 lb 12.8 oz (98.8 kg)   04/24/17 217 lb (98.4 kg)   10/14/16 222 lb (100.7 kg)              We Performed the Following     ADMIN INFLUENZA (For MEDICARE Patients ONLY) []     FLU VACCINE, INCREASED ANTIGEN, PRESV FREE, AGE 65+ [29386]        Primary Care Provider Office Phone # Fax #    Medina Pavithra Grissom [phone] [address] [facility] [address]        Equal Access to Services     ESTHER STALLWORTH : Hadii sergo mike Sogabino, waaxda luqadaha, qaybta kaalmada adeegyacarole, marian ansari . So [facility] [phone].    ATENCIÓN: Si habla español, tiene a joaquin disposición servicios gratuitos de asistencia lingüística. Llame al [phone].    We comply with applicable federal civil rights laws and [place] laws. We do not discriminate on the basis of race, color, national origin, age, disability, sex, sexual orientation, or gender identity.            Thank you!     Thank you for choosing [facility]  for your care. Our goal is always to provide you with excellent care. Hearing back from our patients is one way we can continue to improve our services. Please take a few minutes to complete the written survey that you may receive in the mail after your visit with us. Thank you!             Your Updated Medication List - Protect others around you: Learn how to safely use, store and throw away your medicines at www.disposemymeds.org.          This list is accurate as of: 10/18/17 12:29 PM.  Always use your most recent med list.                   Brand Name Dispense Instructions for use Diagnosis    aspirin 81 MG EC tablet      Take 81 mg by mouth daily        glipiZIDE 2.5 MG 24 hr tablet     GLUCOTROL XL    180 tablet    Take 2 tablets (5 mg) by mouth daily (with breakfast)    Diabetes mellitus type 2, noninsulin dependent (H)       IBUPROFEN PO      Take 200 mg by mouth 2 times daily        lisinopril-hydrochlorothiazide 20-25 MG per tablet    PRINZIDE/ZESTORETIC    90 tablet    Take 1 tablet by mouth every morning    Essential hypertension, benign       metFORMIN 500 MG tablet    GLUCOPHAGE    180 tablet    Take 1 tablet (500 mg) by mouth 2 times daily (with meals)    Diabetes mellitus type 2, noninsulin dependent (H)       metoprolol 25 MG 24 hr tablet    TOPROL-XL    45 tablet    Take 0.5 tablets (12.5 mg) by mouth daily    Essential hypertension, benign       order for DME     1 Device    Diabetic shoes    Diabetes mellitus type 2, noninsulin dependent (H)       simvastatin 10 MG tablet    ZOCOR    90 tablet    Take 1 tablet (10 mg) by mouth At Bedtime (Needs follow-up appointment for this medication)    Hyperlipidemia LDL goal <100

## 2017-11-22 NOTE — TELEPHONE ENCOUNTER
Pt notified via voicemail telling him to call back and schedule an appt to fill out form.    [facility]

## 2017-11-22 NOTE — TELEPHONE ENCOUNTER
Per Dr. Grissom patient requires an appointment to discuss his request for a driving note for the [place] [employer].
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
## 2017-11-28 NOTE — MR AVS SNAPSHOT
"              After Visit Summary   11/28/2017    Richard Sosa    MRN: [id number]           Patient Information     Date Of Birth          1940        Visit Information        Provider Department      11/28/2017 8:00 AM Medina Grissom MD [facility]        Today's Diagnoses     Diabetes mellitus type 2, noninsulin dependent (H)    -  1    Essential hypertension, benign           Follow-ups after your visit        Future tests that were ordered for you today     Open Future Orders        Priority Expected Expires Ordered    Hemoglobin A1c Routine 2/27/2018 5/29/2018 11/28/2017            Who to contact     If you have questions or need follow up information about today's clinic visit or your schedule please contact [facility] directly at [phone].  Normal or non-critical lab and imaging results will be communicated to you by MyChart, letter or phone within 4 business days after the clinic has received the results. If you do not hear from us within 7 days, please contact the clinic through MyChart or phone. If you have a critical or abnormal lab result, we will notify you by phone as soon as possible.  Submit refill requests through [employer] or call your pharmacy and they will forward the refill request to us. Please allow 3 business days for your refill to be completed.          Additional Information About Your Visit        MyChart Information     [employer] lets you send messages to your doctor, view your test results, renew your prescriptions, schedule appointments and more. To sign up, go to [email or web address] . Click on \"Log in\" on the left side of the screen, which will take you to the Welcome page. Then click on \"Sign up Now\" on the right side of the page.     You will be asked to enter the access code listed below, as well as some personal information. Please follow the directions to create your username and password.     Your access code is: " [email or web address]  Expires: 2018 11:29 AM     Your access code will  in 90 days. If you need help or a new code, please call your [place] clinic or [phone].        Care EveryWhere ID     This is your Care EveryWhere ID. This could be used by other organizations to access your [place] medical records  [id number]        Your Vitals Were     Pulse Temperature BMI (Body Mass Index)             59 97.5  F (36.4  C) (Tympanic) 32.02 kg/m2          Blood Pressure from Last 3 Encounters:   17 126/75   17 131/56   17 131/75    Weight from Last 3 Encounters:   17 220 lb (99.8 kg)   17 217 lb 12.8 oz (98.8 kg)   17 217 lb (98.4 kg)                 Today's Medication Changes          These changes are accurate as of: 17 12:33 PM.  If you have any questions, ask your nurse or doctor.               Stop taking these medicines if you haven't already. Please contact your care team if you have questions.     metoprolol 25 MG 24 hr tablet   Commonly known as:  TOPROL-XL           simvastatin 10 MG tablet   Commonly known as:  ZOCOR                Where to get your medicines      These medications were sent to [address] [address]     Phone:  [phone]     lisinopril-hydrochlorothiazide 20-25 MG per tablet                Primary Care Provider Office Phone # Fax #    Medina Pavithra Grissom [phone] [address] [facility] [address]        Equal Access to Services     ESTHER STALLWORTH : matt Sequeira, marian clayton. So [facility] [phone].    ATENCIÓN: Si habla español, tiene a joaquin disposición servicios gratuitos de asistencia lingüística. Llame al [phone].    We comply with applicable federal civil rights laws and [place] laws. We do not discriminate on the basis of race, color, national  origin, age, disability, sex, sexual orientation, or gender identity.            Thank you!     Thank you for choosing [facility]  for your care. Our goal is always to provide you with excellent care. Hearing back from our patients is one way we can continue to improve our services. Please take a few minutes to complete the written survey that you may receive in the mail after your visit with us. Thank you!             Your Updated Medication List - Protect others around you: Learn how to safely use, store and throw away your medicines at www.disposemymeds.org.          This list is accurate as of: 11/28/17 12:33 PM.  Always use your most recent med list.                   Brand Name Dispense Instructions for use Diagnosis    aspirin 81 MG EC tablet      Take 81 mg by mouth daily        glipiZIDE 2.5 MG 24 hr tablet    GLUCOTROL XL    180 tablet    Take 2 tablets (5 mg) by mouth daily (with breakfast)    Diabetes mellitus type 2, noninsulin dependent (H)       IBUPROFEN PO      Take 200 mg by mouth 2 times daily        lisinopril-hydrochlorothiazide 20-25 MG per tablet    PRINZIDE/ZESTORETIC    90 tablet    Take 1 tablet by mouth every morning    Essential hypertension, benign       metFORMIN 500 MG tablet    GLUCOPHAGE    180 tablet    Take 1 tablet (500 mg) by mouth 2 times daily (with meals)    Diabetes mellitus type 2, noninsulin dependent (H)       order for DME     1 Device    Diabetic shoes    Diabetes mellitus type 2, noninsulin dependent (H)

## 2017-11-28 NOTE — NURSING NOTE
"Initial /75  Pulse 59  Temp 97.5  F (36.4  C) (Tympanic)  Wt 220 lb (99.8 kg)  BMI 32.02 kg/m2 Estimated body mass index is 32.02 kg/(m^2) as calculated from the following:    Height as of 10/14/16: 5' 9.5\" (1.765 m).    Weight as of this encounter: 220 lb (99.8 kg). .      "
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
